# Patient Record
Sex: FEMALE | Race: WHITE
[De-identification: names, ages, dates, MRNs, and addresses within clinical notes are randomized per-mention and may not be internally consistent; named-entity substitution may affect disease eponyms.]

---

## 2020-01-28 ENCOUNTER — HOSPITAL ENCOUNTER (OUTPATIENT)
Dept: HOSPITAL 75 - LAB FS | Age: 25
End: 2020-01-28
Attending: FAMILY MEDICINE
Payer: MEDICAID

## 2020-01-28 DIAGNOSIS — R53.82: ICD-10-CM

## 2020-01-28 DIAGNOSIS — Z34.80: Primary | ICD-10-CM

## 2020-01-28 PROCEDURE — 82677 ASSAY OF ESTRIOL: CPT

## 2020-01-28 PROCEDURE — 82105 ALPHA-FETOPROTEIN SERUM: CPT

## 2020-01-28 PROCEDURE — 36415 COLL VENOUS BLD VENIPUNCTURE: CPT

## 2020-01-28 PROCEDURE — 86336 INHIBIN A: CPT

## 2020-01-28 PROCEDURE — 84439 ASSAY OF FREE THYROXINE: CPT

## 2020-01-28 PROCEDURE — 84443 ASSAY THYROID STIM HORMONE: CPT

## 2020-01-28 PROCEDURE — 84702 CHORIONIC GONADOTROPIN TEST: CPT

## 2020-01-29 LAB — T4 FREE SERPL-MCNC: 0.78 NG/DL (ref 0.7–1.48)

## 2020-03-31 ENCOUNTER — HOSPITAL ENCOUNTER (OUTPATIENT)
Dept: HOSPITAL 75 - LAB FS | Age: 25
End: 2020-03-31
Attending: FAMILY MEDICINE
Payer: MEDICAID

## 2020-03-31 DIAGNOSIS — Z34.80: Primary | ICD-10-CM

## 2020-03-31 LAB
BASOPHILS # BLD AUTO: 0.1 10^3/UL (ref 0–0.1)
BASOPHILS NFR BLD AUTO: 1 % (ref 0–10)
EOSINOPHIL # BLD AUTO: 0.2 10^3/UL (ref 0–0.3)
EOSINOPHIL NFR BLD AUTO: 2 % (ref 0–10)
ERYTHROCYTE [DISTWIDTH] IN BLOOD BY AUTOMATED COUNT: 13.5 % (ref 10–14.5)
HCT VFR BLD CALC: 30 % (ref 35–52)
HGB BLD-MCNC: 10.5 G/DL (ref 11.5–16)
LYMPHOCYTES # BLD AUTO: 1.4 X 10^3 (ref 1–4)
LYMPHOCYTES NFR BLD AUTO: 14 % (ref 12–44)
MANUAL DIFFERENTIAL PERFORMED BLD QL: NO
MCH RBC QN AUTO: 30 PG (ref 25–34)
MCHC RBC AUTO-ENTMCNC: 35 G/DL (ref 32–36)
MCV RBC AUTO: 86 FL (ref 80–99)
MONOCYTES # BLD AUTO: 0.5 X 10^3 (ref 0–1)
MONOCYTES NFR BLD AUTO: 5 % (ref 0–12)
NEUTROPHILS # BLD AUTO: 7.5 X 10^3 (ref 1.8–7.8)
NEUTROPHILS NFR BLD AUTO: 77 % (ref 42–75)
PLATELET # BLD: 144 10^3/UL (ref 130–400)
PMV BLD AUTO: 12.6 FL (ref 7.4–10.4)
WBC # BLD AUTO: 9.8 10^3/UL (ref 4.3–11)

## 2020-03-31 PROCEDURE — 82950 GLUCOSE TEST: CPT

## 2020-03-31 PROCEDURE — 36415 COLL VENOUS BLD VENIPUNCTURE: CPT

## 2020-03-31 PROCEDURE — 85025 COMPLETE CBC W/AUTO DIFF WBC: CPT

## 2020-03-31 PROCEDURE — 86780 TREPONEMA PALLIDUM: CPT

## 2020-06-02 ENCOUNTER — HOSPITAL ENCOUNTER (OUTPATIENT)
Dept: HOSPITAL 75 - LABNPT | Age: 25
End: 2020-06-02
Attending: FAMILY MEDICINE
Payer: MEDICAID

## 2020-06-02 DIAGNOSIS — Z3A.00: ICD-10-CM

## 2020-06-02 DIAGNOSIS — Z34.83: Primary | ICD-10-CM

## 2020-06-02 PROCEDURE — 87081 CULTURE SCREEN ONLY: CPT

## 2020-06-29 ENCOUNTER — HOSPITAL ENCOUNTER (INPATIENT)
Dept: HOSPITAL 75 - LDRP | Age: 25
LOS: 2 days | Discharge: HOME | End: 2020-07-01
Attending: FAMILY MEDICINE | Admitting: FAMILY MEDICINE
Payer: MEDICAID

## 2020-06-29 VITALS — SYSTOLIC BLOOD PRESSURE: 122 MMHG | DIASTOLIC BLOOD PRESSURE: 72 MMHG

## 2020-06-29 VITALS — DIASTOLIC BLOOD PRESSURE: 67 MMHG | SYSTOLIC BLOOD PRESSURE: 118 MMHG

## 2020-06-29 VITALS — WEIGHT: 261.03 LBS | BODY MASS INDEX: 42.45 KG/M2 | HEIGHT: 65.75 IN

## 2020-06-29 VITALS — DIASTOLIC BLOOD PRESSURE: 66 MMHG | SYSTOLIC BLOOD PRESSURE: 120 MMHG

## 2020-06-29 VITALS — DIASTOLIC BLOOD PRESSURE: 72 MMHG | SYSTOLIC BLOOD PRESSURE: 122 MMHG

## 2020-06-29 VITALS — SYSTOLIC BLOOD PRESSURE: 105 MMHG | DIASTOLIC BLOOD PRESSURE: 58 MMHG

## 2020-06-29 DIAGNOSIS — Z3A.39: ICD-10-CM

## 2020-06-29 LAB
BASOPHILS # BLD AUTO: 0 10^3/UL (ref 0–0.1)
BASOPHILS NFR BLD AUTO: 0 % (ref 0–10)
EOSINOPHIL # BLD AUTO: 0.2 10^3/UL (ref 0–0.3)
EOSINOPHIL NFR BLD AUTO: 2 % (ref 0–10)
ERYTHROCYTE [DISTWIDTH] IN BLOOD BY AUTOMATED COUNT: 13.9 % (ref 10–14.5)
HCT VFR BLD CALC: 33 % (ref 35–52)
HGB BLD-MCNC: 11.8 G/DL (ref 11.5–16)
LYMPHOCYTES # BLD AUTO: 1.4 X 10^3 (ref 1–4)
LYMPHOCYTES NFR BLD AUTO: 11 % (ref 12–44)
MANUAL DIFFERENTIAL PERFORMED BLD QL: NO
MCH RBC QN AUTO: 31 PG (ref 25–34)
MCHC RBC AUTO-ENTMCNC: 36 G/DL (ref 32–36)
MCV RBC AUTO: 86 FL (ref 80–99)
MONOCYTES # BLD AUTO: 0.7 X 10^3 (ref 0–1)
MONOCYTES NFR BLD AUTO: 6 % (ref 0–12)
NEUTROPHILS # BLD AUTO: 9.6 X 10^3 (ref 1.8–7.8)
NEUTROPHILS NFR BLD AUTO: 80 % (ref 42–75)
PLATELET # BLD: 146 10^3/UL (ref 130–400)
PMV BLD AUTO: (no result) FL (ref 7.4–10.4)
WBC # BLD AUTO: 12 10^3/UL (ref 4.3–11)

## 2020-06-29 PROCEDURE — 86901 BLOOD TYPING SEROLOGIC RH(D): CPT

## 2020-06-29 PROCEDURE — 85025 COMPLETE CBC W/AUTO DIFF WBC: CPT

## 2020-06-29 PROCEDURE — 36415 COLL VENOUS BLD VENIPUNCTURE: CPT

## 2020-06-29 PROCEDURE — 86850 RBC ANTIBODY SCREEN: CPT

## 2020-06-29 PROCEDURE — 86900 BLOOD TYPING SEROLOGIC ABO: CPT

## 2020-06-29 RX ADMIN — MISOPROSTOL SCH MCG: 100 TABLET ORAL at 22:24

## 2020-06-29 RX ADMIN — SODIUM CHLORIDE, SODIUM LACTATE, POTASSIUM CHLORIDE, CALCIUM CHLORIDE, AND DEXTROSE MONOHYDRATE SCH MLS/HR: 600; 310; 30; 20; 5 INJECTION, SOLUTION INTRAVENOUS at 21:34

## 2020-06-29 NOTE — XMS REPORT
Continuity of Care Document

                             Created on: 2020



Maritza Arreola

External Reference #: [id number]

: 1995

Sex: Female



Demographics





                          Address                   [address]  [address]

 

                          Home Phone                [phone] x

 

                          Preferred Language        Unknown

 

                          Marital Status            Unknown

 

                          Advent Affiliation     Unknown

 

                          Race                      Unknown

 

                          Ethnic Group              Unknown





Author





                          Organization              Unknown

 

                          Address                   Unknown

 

                          Phone                     Unavailable



              



Allergies

      



             Active           Description           Code           Type         

  Severity   

                Reaction           Onset           Reported/Identified          

 

Relationship to Patient                 Clinical Status        

 

                Yes             No Known Drug Allergies           [id number]    

       Drug 

Allergy           Unknown           N/A                             2020  

      

                                                             



                  



Medications

      



There is no data.                  



Problems

      



             Date Dx Coded           Attending           Type           Code    

       

Diagnosis                               Diagnosed By        

 

                2020           EBONI OMALLEY MD, Ot           

   Z34.80          

                          ENCOUNTER FOR SUPRVSN OF NORMAL PREGNANC              

      

 

                2020           EBONI OMALLEY MD           Ot           

   R53.82          

                          CHRONIC FATIGUE, UNSPECIFIED                    

 

                2020           EBONI OMALLEY MD           Ot           

   Z34.80          

                          ENCOUNTER FOR SUPRVSN OF NORMAL PREGNANC              

      

 

                2020           EBONI OMALLEY MD, Ot           

   R53.82          

                          CHRONIC FATIGUE, UNSPECIFIED                    

 

                2020           EBONI OMALLEY MD           Ot           

   Z34.80          

                          ENCOUNTER FOR SUPRVSN OF NORMAL PREGNANC              

      

 

                2020           EBONI OMALLEY MD           Ot           

   Z34.80          

                          ENCOUNTER FOR SUPRVSN OF NORMAL PREGNANC              

      

 

                2020           EBONI OMALLEY MD           Ot           

   R53.82          

                          CHRONIC FATIGUE, UNSPECIFIED                    

 

                2020           EBONI OMALLEY MD           Ot           

   Z34.80          

                          ENCOUNTER FOR SUPRVSN OF NORMAL PREGNANC              

      

 

                2020           EBONI OMALLEY MD           Ot           

   Z34.80          

                          ENCOUNTER FOR SUPRVSN OF NORMAL PREGNANC              

      

 

                2020           EBONI OMALLEY MD           Ot           

   Z34.83          

                          ENCOUNTER FOR SUPRVSN OF NORMAL PREGNANC              

      

 

                2020           EBONI OMALLEY MD           Ot           

   Z3A.00          

                          WEEKS OF GESTATION OF PREGNANCY NOT SPEC              

      

 

                06/10/2020           EBONI OMALLEY MD           Ot           

   Z34.83          

                          ENCOUNTER FOR SUPRVSN OF NORMAL PREGNANC              

      

 

                06/10/2020           EBONI OMALLEY MD, Ot           

   Z3A.00          

                          WEEKS OF GESTATION OF PREGNANCY NOT SPEC              

      



                                          



Procedures

      



There is no data.                  



Results

      



                    Test                Result              Range        

 

                                        HIV ANTIGEN/ANTIBODY - 19 14:42   

      

 

                    HIV AG/AB, 4TH GEN           NON-REACTIVE            NON-MERVAT

CTIVE        

 

                                        CBC - 19 14:42         

 

                    WHITE BLOOD CELL COUNT           7.9 Thousand/uL           3

.8-10.8        

 

                    RED BLOOD CELL COUNT           4.57 Million/uL           3.8

0-5.10        

 

                    HEMOGLOBIN           13.8 g/dL           11.7-15.5        

 

                    HEMATOCRIT           39.5 %              35.0-45.0        

 

                    MCV                 86.4 fL             80.0-100.0        

 

                    MCH                 30.2 pg             27.0-33.0        

 

                    MCHC                34.9 g/dL           32.0-36.0        

 

                    RDW                 13.0 %              11.0-15.0        

 

                    PLATELET COUNT           215 Thousand/uL           140-400  

      

 

                    MPV                 12.7 fL             7.5-12.5        

 

                    ABSOLUTE NEUTROPHILS           5570 cells/uL           1500-

7800        

 

                    ABSOLUTE LYMPHOCYTES           1596 cells/uL           850-3

900        

 

                    ABSOLUTE MONOCYTES           435 cells/uL           200-950 

       

 

                    ABSOLUTE EOSINOPHILS           237 cells/uL           

        

 

                    ABSOLUTE BASOPHILS           63 cells/uL           0-200    

    

 

                    NEUTROPHILS           70.5 %              NRG        

 

                    LYMPHOCYTES           20.2 %              NRG        

 

                    MONOCYTES           5.5 %               NRG        

 

                    EOSINOPHILS           3.0 %               NRG        

 

                    BASOPHILS           0.8 %               NRG        

 

                                        SYPHILIS (RPR W/ REFLEX CONFIRMATION) - 

19 14:42         

 

                          RPR (DX) W/REFL TITER AND CONFIRMATORY TESTING        

   NON-REACTIVE           

                                        NON-REACTIVE        

 

                                        HEP B SURFACE ANTIGEN - 19 14:42  

       

 

                    HEPATITIS B SURFACE ANTIGEN           NON-REACTIVE          

  NON-REACTIVE      

 

 

                                        RUBELLA IMMUNE STATUS - 19 14:42  

       

 

                    RUBELLA ANTIBODY (IGG)           1.84 index           NRG   

     

 

                                        GC/CHLAMYDIA (SWAB OR URINE)-RAPID -  13:36         

 

                    CHLAMYDIA TRACHOMATIS RNA, TMA           NOT DETECTED       

     NOT DETECTED   

    

 

                    NEISSERIA GONORRHOEAE RNA, TMA           NOT DETECTED       

     NOT DETECTED   

    

 

                    COMMENT                                 NRG        

 

                                        SUREPATH PAP RFX HPV mRNA E6/E7 -  13:36         

 

                    CLINICAL INFORMATION:                               NRG     

   

 

                    LMP:                                    NRG        

 

                    PREV. PAP:                               NRG        

 

                    PREV. BX:                               NRG        

 

                    SOURCE:             [employer]              NRG        

 

                    STATEMENT OF ADEQUACY:                               NRG    

    

 

                    INTERPRETATION/RESULT:                               NRG    

    

 

                    CYTOTECHNOLOGIST:                               NRG        

 

                    COMMENT                                 NRG        

 

                                        Streptococcus agalactiae detection by or

ganism specific culture - 20 13:00

        

 

                    QUANTITY OF GROWTH           .                   NRG        

 

                          Streptococcus agalactiae detection by organism specifi

c culture           

[id number]                                NRG        



                              



Encounters

      



                ACCT No.           Visit Date/Time           Discharge          

 Status         

             Pt. Type           Provider           Facility           Loc./Unit 

          

Complaint        

 

                [id number]           01/15/2020 11:00:00           01/15/2020 23:59:

59           CLS

                Outpatient           EBONI OMALLEY                           

[facility]                                 

 

             [id number]           2019 13:00:00                              

       Document

Registration                                                                    

 

             [id number]           2019 13:00:00                              

       Document

Registration                                                                    

 

                    [id number]           2020 15:40:00            23:59:59        

                CLS             Outpatient           EBONI OMALLEY MD        

   [facility]           LABNPT                             

 

                    [id number]           2020 09:39:00            23:59:59        

                CLS             Outpatient           EBONI OMALLEY MD        

   [facility]           LAB FS                    SUPERVISION OF OTHER NO

RMAL 

PREGNANCY        

 

                    [id number]           2020 15:17:00            23:59:59        

                CLS             Outpatient           EBONI OMALLEY MD        

   [facility]           LAB FS                    R53.82 Z34.80         

 

             [id number]           2020 20:37:00                        A

CT           

Inpatient                 EBONI OMALLEY MD           [facility]                 LDRP                      INDUCTION

## 2020-06-30 VITALS — DIASTOLIC BLOOD PRESSURE: 58 MMHG | SYSTOLIC BLOOD PRESSURE: 110 MMHG

## 2020-06-30 VITALS — SYSTOLIC BLOOD PRESSURE: 91 MMHG | DIASTOLIC BLOOD PRESSURE: 49 MMHG

## 2020-06-30 VITALS — SYSTOLIC BLOOD PRESSURE: 108 MMHG | DIASTOLIC BLOOD PRESSURE: 58 MMHG

## 2020-06-30 VITALS — DIASTOLIC BLOOD PRESSURE: 53 MMHG | SYSTOLIC BLOOD PRESSURE: 92 MMHG

## 2020-06-30 VITALS — SYSTOLIC BLOOD PRESSURE: 126 MMHG | DIASTOLIC BLOOD PRESSURE: 63 MMHG

## 2020-06-30 VITALS — SYSTOLIC BLOOD PRESSURE: 108 MMHG | DIASTOLIC BLOOD PRESSURE: 61 MMHG

## 2020-06-30 VITALS — SYSTOLIC BLOOD PRESSURE: 130 MMHG | DIASTOLIC BLOOD PRESSURE: 71 MMHG

## 2020-06-30 VITALS — SYSTOLIC BLOOD PRESSURE: 119 MMHG | DIASTOLIC BLOOD PRESSURE: 60 MMHG

## 2020-06-30 VITALS — DIASTOLIC BLOOD PRESSURE: 56 MMHG | SYSTOLIC BLOOD PRESSURE: 100 MMHG

## 2020-06-30 VITALS — DIASTOLIC BLOOD PRESSURE: 78 MMHG | SYSTOLIC BLOOD PRESSURE: 141 MMHG

## 2020-06-30 VITALS — SYSTOLIC BLOOD PRESSURE: 116 MMHG | DIASTOLIC BLOOD PRESSURE: 59 MMHG

## 2020-06-30 VITALS — SYSTOLIC BLOOD PRESSURE: 102 MMHG | DIASTOLIC BLOOD PRESSURE: 63 MMHG

## 2020-06-30 VITALS — SYSTOLIC BLOOD PRESSURE: 120 MMHG | DIASTOLIC BLOOD PRESSURE: 63 MMHG

## 2020-06-30 VITALS — DIASTOLIC BLOOD PRESSURE: 70 MMHG | SYSTOLIC BLOOD PRESSURE: 122 MMHG

## 2020-06-30 VITALS — DIASTOLIC BLOOD PRESSURE: 76 MMHG | SYSTOLIC BLOOD PRESSURE: 135 MMHG

## 2020-06-30 VITALS — DIASTOLIC BLOOD PRESSURE: 49 MMHG | SYSTOLIC BLOOD PRESSURE: 106 MMHG

## 2020-06-30 VITALS — DIASTOLIC BLOOD PRESSURE: 57 MMHG | SYSTOLIC BLOOD PRESSURE: 104 MMHG

## 2020-06-30 VITALS — SYSTOLIC BLOOD PRESSURE: 123 MMHG | DIASTOLIC BLOOD PRESSURE: 71 MMHG

## 2020-06-30 VITALS — DIASTOLIC BLOOD PRESSURE: 63 MMHG | SYSTOLIC BLOOD PRESSURE: 108 MMHG

## 2020-06-30 VITALS — SYSTOLIC BLOOD PRESSURE: 119 MMHG | DIASTOLIC BLOOD PRESSURE: 56 MMHG

## 2020-06-30 VITALS — SYSTOLIC BLOOD PRESSURE: 126 MMHG | DIASTOLIC BLOOD PRESSURE: 66 MMHG

## 2020-06-30 VITALS — DIASTOLIC BLOOD PRESSURE: 75 MMHG | SYSTOLIC BLOOD PRESSURE: 157 MMHG

## 2020-06-30 VITALS — SYSTOLIC BLOOD PRESSURE: 127 MMHG | DIASTOLIC BLOOD PRESSURE: 74 MMHG

## 2020-06-30 VITALS — SYSTOLIC BLOOD PRESSURE: 89 MMHG | DIASTOLIC BLOOD PRESSURE: 48 MMHG

## 2020-06-30 VITALS — DIASTOLIC BLOOD PRESSURE: 59 MMHG | SYSTOLIC BLOOD PRESSURE: 104 MMHG

## 2020-06-30 VITALS — SYSTOLIC BLOOD PRESSURE: 94 MMHG | DIASTOLIC BLOOD PRESSURE: 52 MMHG

## 2020-06-30 VITALS — SYSTOLIC BLOOD PRESSURE: 110 MMHG | DIASTOLIC BLOOD PRESSURE: 71 MMHG

## 2020-06-30 VITALS — SYSTOLIC BLOOD PRESSURE: 118 MMHG | DIASTOLIC BLOOD PRESSURE: 58 MMHG

## 2020-06-30 VITALS — SYSTOLIC BLOOD PRESSURE: 107 MMHG | DIASTOLIC BLOOD PRESSURE: 52 MMHG

## 2020-06-30 VITALS — SYSTOLIC BLOOD PRESSURE: 97 MMHG | DIASTOLIC BLOOD PRESSURE: 55 MMHG

## 2020-06-30 VITALS — SYSTOLIC BLOOD PRESSURE: 102 MMHG | DIASTOLIC BLOOD PRESSURE: 56 MMHG

## 2020-06-30 VITALS — SYSTOLIC BLOOD PRESSURE: 95 MMHG | DIASTOLIC BLOOD PRESSURE: 54 MMHG

## 2020-06-30 VITALS — SYSTOLIC BLOOD PRESSURE: 102 MMHG | DIASTOLIC BLOOD PRESSURE: 58 MMHG

## 2020-06-30 VITALS — DIASTOLIC BLOOD PRESSURE: 55 MMHG | SYSTOLIC BLOOD PRESSURE: 101 MMHG

## 2020-06-30 VITALS — SYSTOLIC BLOOD PRESSURE: 124 MMHG | DIASTOLIC BLOOD PRESSURE: 70 MMHG

## 2020-06-30 VITALS — SYSTOLIC BLOOD PRESSURE: 112 MMHG | DIASTOLIC BLOOD PRESSURE: 57 MMHG

## 2020-06-30 VITALS — SYSTOLIC BLOOD PRESSURE: 117 MMHG | DIASTOLIC BLOOD PRESSURE: 60 MMHG

## 2020-06-30 VITALS — SYSTOLIC BLOOD PRESSURE: 125 MMHG | DIASTOLIC BLOOD PRESSURE: 64 MMHG

## 2020-06-30 VITALS — DIASTOLIC BLOOD PRESSURE: 67 MMHG | SYSTOLIC BLOOD PRESSURE: 127 MMHG

## 2020-06-30 VITALS — SYSTOLIC BLOOD PRESSURE: 124 MMHG | DIASTOLIC BLOOD PRESSURE: 66 MMHG

## 2020-06-30 VITALS — SYSTOLIC BLOOD PRESSURE: 123 MMHG | DIASTOLIC BLOOD PRESSURE: 72 MMHG

## 2020-06-30 VITALS — SYSTOLIC BLOOD PRESSURE: 138 MMHG | DIASTOLIC BLOOD PRESSURE: 80 MMHG

## 2020-06-30 VITALS — DIASTOLIC BLOOD PRESSURE: 60 MMHG | SYSTOLIC BLOOD PRESSURE: 118 MMHG

## 2020-06-30 VITALS — SYSTOLIC BLOOD PRESSURE: 121 MMHG | DIASTOLIC BLOOD PRESSURE: 67 MMHG

## 2020-06-30 VITALS — DIASTOLIC BLOOD PRESSURE: 58 MMHG | SYSTOLIC BLOOD PRESSURE: 111 MMHG

## 2020-06-30 VITALS — DIASTOLIC BLOOD PRESSURE: 50 MMHG | SYSTOLIC BLOOD PRESSURE: 91 MMHG

## 2020-06-30 VITALS — SYSTOLIC BLOOD PRESSURE: 109 MMHG | DIASTOLIC BLOOD PRESSURE: 61 MMHG

## 2020-06-30 VITALS — SYSTOLIC BLOOD PRESSURE: 144 MMHG | DIASTOLIC BLOOD PRESSURE: 72 MMHG

## 2020-06-30 VITALS — SYSTOLIC BLOOD PRESSURE: 123 MMHG | DIASTOLIC BLOOD PRESSURE: 68 MMHG

## 2020-06-30 VITALS — SYSTOLIC BLOOD PRESSURE: 135 MMHG | DIASTOLIC BLOOD PRESSURE: 71 MMHG

## 2020-06-30 VITALS — SYSTOLIC BLOOD PRESSURE: 116 MMHG | DIASTOLIC BLOOD PRESSURE: 60 MMHG

## 2020-06-30 VITALS — DIASTOLIC BLOOD PRESSURE: 72 MMHG | SYSTOLIC BLOOD PRESSURE: 117 MMHG

## 2020-06-30 VITALS — SYSTOLIC BLOOD PRESSURE: 119 MMHG | DIASTOLIC BLOOD PRESSURE: 58 MMHG

## 2020-06-30 VITALS — SYSTOLIC BLOOD PRESSURE: 125 MMHG | DIASTOLIC BLOOD PRESSURE: 73 MMHG

## 2020-06-30 VITALS — DIASTOLIC BLOOD PRESSURE: 56 MMHG | SYSTOLIC BLOOD PRESSURE: 104 MMHG

## 2020-06-30 VITALS — DIASTOLIC BLOOD PRESSURE: 52 MMHG | SYSTOLIC BLOOD PRESSURE: 116 MMHG

## 2020-06-30 VITALS — DIASTOLIC BLOOD PRESSURE: 67 MMHG | SYSTOLIC BLOOD PRESSURE: 130 MMHG

## 2020-06-30 VITALS — SYSTOLIC BLOOD PRESSURE: 116 MMHG | DIASTOLIC BLOOD PRESSURE: 70 MMHG

## 2020-06-30 VITALS — DIASTOLIC BLOOD PRESSURE: 59 MMHG | SYSTOLIC BLOOD PRESSURE: 115 MMHG

## 2020-06-30 VITALS — DIASTOLIC BLOOD PRESSURE: 56 MMHG | SYSTOLIC BLOOD PRESSURE: 102 MMHG

## 2020-06-30 VITALS — SYSTOLIC BLOOD PRESSURE: 125 MMHG | DIASTOLIC BLOOD PRESSURE: 68 MMHG

## 2020-06-30 VITALS — DIASTOLIC BLOOD PRESSURE: 55 MMHG | SYSTOLIC BLOOD PRESSURE: 111 MMHG

## 2020-06-30 VITALS — DIASTOLIC BLOOD PRESSURE: 62 MMHG | SYSTOLIC BLOOD PRESSURE: 108 MMHG

## 2020-06-30 VITALS — DIASTOLIC BLOOD PRESSURE: 57 MMHG | SYSTOLIC BLOOD PRESSURE: 116 MMHG

## 2020-06-30 VITALS — DIASTOLIC BLOOD PRESSURE: 64 MMHG | SYSTOLIC BLOOD PRESSURE: 132 MMHG

## 2020-06-30 VITALS — DIASTOLIC BLOOD PRESSURE: 57 MMHG | SYSTOLIC BLOOD PRESSURE: 115 MMHG

## 2020-06-30 VITALS — DIASTOLIC BLOOD PRESSURE: 70 MMHG | SYSTOLIC BLOOD PRESSURE: 123 MMHG

## 2020-06-30 RX ADMIN — SODIUM CHLORIDE, SODIUM LACTATE, POTASSIUM CHLORIDE, CALCIUM CHLORIDE, AND DEXTROSE MONOHYDRATE SCH MLS/HR: 600; 310; 30; 20; 5 INJECTION, SOLUTION INTRAVENOUS at 13:04

## 2020-06-30 RX ADMIN — SODIUM CHLORIDE, SODIUM LACTATE, POTASSIUM CHLORIDE, CALCIUM CHLORIDE, AND DEXTROSE MONOHYDRATE SCH MLS/HR: 600; 310; 30; 20; 5 INJECTION, SOLUTION INTRAVENOUS at 05:46

## 2020-06-30 RX ADMIN — DOCUSATE SODIUM SCH MG: 100 CAPSULE ORAL at 20:56

## 2020-06-30 RX ADMIN — MISOPROSTOL SCH MCG: 100 TABLET ORAL at 14:49

## 2020-06-30 RX ADMIN — IBUPROFEN SCH MG: 600 TABLET ORAL at 18:12

## 2020-06-30 RX ADMIN — ACETAMINOPHEN SCH MG: 500 TABLET ORAL at 20:56

## 2020-06-30 RX ADMIN — MISOPROSTOL SCH MCG: 100 TABLET ORAL at 02:34

## 2020-06-30 NOTE — NUR
VSS.  ffu/0 with lt-mod rubra noted, no clots expressed.

1747 vss ffu/0 with mod rubra, 2 small 1cm clots expressed. vss

1802 vss .  ffu/0 with lt-mod rubra noted, no clots expressed.  pericare, pad changed and 
underwear on.  Gown changed.

## 2020-06-30 NOTE — NUR
pt sitting up in bed. Assessment completed. Pt still unable to move right lower extremity. 
Will wait to move to postpartum room. ff 2 below. light/moderate rubra. Pt denies any needs 
at this time. Will continue to monitor.

## 2020-06-30 NOTE — OB LABOR & DELIVERY RECORD
Vag Delivery Note


Vag Delivery Note


Date of Delivery: 20 





Preoperative Diagnosis: Maritza Arreola  is a (24  /Para   2/1 ,

Gestational Age (40 wks)with [0 days]





Postoperative Diagnosis: Same


Surgeon: EBONI OMALLEY 





Assistant: [none]





Anesthesia: [epidural]





Delivery Type: []





Findings: []


Viable [female] infant, apgars [7/9], weight [8 pounds 0 ounces]


Lacerations:


Intact placenta with 3 vessel cord. Loose nuchal cord times one with true knot 

loose. 





Estimated Blood Loss: [200] ml





Complications: None





Condition: Stable





Description of Procedure:





The patient is a 24 year old female who presented [for induction]. She was 

admitted and informed consent was obtained. Her labor course was remarkable for 

[nothing] She progressed to complete dilatation and began to push. 





She was then set up for delivery. The infant's head was delivered atraumatically

 in the [OA] position. The shoulders and remainder of the infant's body were 

then delivered without difficulty. Upon delivery, the head was held below the 

level of the perineum and the mouth and nares were bulb suctioned. The cord was 

doubly clamped and cut after 60 seconds on maternal abdomen by father of baby.  

 An intact placenta with 3-vessel cord delivered via Francis and there was found

 to be minimal bleeding.~ Vigorous fundal massage was performed and the fundus 

was found to be firm. IV oxytocin was given. Examination of the vagina and 

perineum revealed no lacerations.. Following the repair, sponge, instrument and 

needle counts were correct. Mom and baby were both in stable condition in the 

labor suite.





Vitals - Labs


Vital Signs - I&O





Vital Signs








  Date Time  Temp Pulse Resp B/P (MAP) Pulse Ox O2 Delivery O2 Flow Rate FiO2


 


20 16:30  70 18 111/58 (75)  Room Air  


 


20 16:15 36.4 67 18 124/66 (85)  Room Air  


 


20 16:00   18 110/71 (84)  Room Air  


 


20 15:45  70 18 108/62 (77) 98 Room Air  


 


20 15:30  79 18 92/53 (66) 98 Room Air  


 


20 15:15  79 18 92/53 (66) 98 Room Air  


 


20 15:02  74 18 91/50 (64) 97 Room Air  


 


20 15:00      Room Air  


 


20 14:45  74 18 116/52 (73) 100 Room Air  


 


20 14:30  71 18 116/57 (76) 98 Room Air  


 


20 14:27  71 18 118/60 (79) 98 Room Air  


 


20 14:24  73 18 116/59 (78) 98 Room Air  


 


20 14:21  74 18 119/58 (78) 98 Room Air  


 


20 14:18  72 18 119/56 (77) 98 Room Air  


 


20 14:15  73 18 116/60 (78) 98 Room Air  


 


20 14:12  72 18 120/63 (82) 98 Room Air  


 


20 14:09 36.4 77 18 117/60 (79) 99 Room Air  


 


20 14:06  77 18 112/57 (75) 99 Room Air  


 


20 14:03  80 18 125/64 (84) 100 Room Air  


 


20 14:00  80 18 126/63 (84) 100 Room Air  


 


20 13:55  75 18 119/60 (79) 100 Room Air  


 


20 13:50  75 18 157/75 (102) 100 Room Air  


 


20 13:45  78 18 132/64 (86) 100 Room Air  


 


20 13:30  73 18 123/70 (87)  Room Air  


 


20 13:15  63 18 135/76 (95)  Room Air  


 


20 13:00 36.6     Room Air  


 


20 12:45  69 18 135/71 (92)  Room Air  


 


20 11:45  74  130/67 (88)  Room Air  


 


20 11:30  65  124/70 (88)  Room Air  


 


20 11:15  66  126/66 (86)  Room Air  


 


20 11:00  67 20 141/78 (99)  Room Air  


 


20 10:30  72 20 118/58 (78)  Room Air  


 


20 10:15 36.7 72  138/80 (99)    


 


20 10:00  76  123/72 (89)  Room Air  


 


20 09:45  76  123/71 (88)  Room Air  


 


20 09:30  69  121/67 (85)  Room Air  


 


20 09:15  74 18 108/58 (75)  Room Air  


 


20 09:00  74 18 100/56 (71)  Room Air  


 


20 08:45  68 18 127/67 (87)  Room Air  


 


20 08:30  94 18 144/72 (96)  Room Air  


 


20 08:15  72 18 104/59 (74)  Room Air  


 


20 08:00  68 18 104/56 (72)  Room Air  


 


20 07:45  72 18 104/59 (74)  Room Air  


 


20 07:30 36.2 80 18 108/61 (77)  Room Air  


 


20 07:15  82 18 116/70 (85)  Room Air  


 


20 07:00  81 18 125/73 (90)  Room Air  


 


20 06:45  91 18 127/74 (91)  Room Air  


 


20 06:30  75 18 109/61 (77)  Room Air  


 


20 06:00  68 18 101/55 (70)  Room Air  


 


20 05:30  71 18 102/56 (71)  Room Air  


 


20 04:30  83 18 110/58 (75)  Room Air  


 


20 04:00  76 18 111/58 (75)  Room Air  


 


20 03:30  73 18 125/68 (87)  Room Air  


 


20 03:00  81 18 94/52 (66)  Room Air  


 


20 02:30 36.5 83 18 104/57 (73)  Room Air  


 


20 02:00  81 18 122/70 (87)  Room Air  


 


20 01:30  86 18 123/68 (86)  Room Air  


 


20 01:00  81 18 130/71 (90)  Room Air  


 


20 00:30  89 18 102/56 (71)  Room Air  


 


20 00:00  90 18 117/72 (87)  Room Air  


 


20 23:39 36.4 106 18  97 Room Air  


 


20 23:30  86 18 120/66 (84)  Room Air  


 


20 23:00  101 18 105/58 (74)  Room Air  


 


20 22:30  97 18 118/67 (84) 97 Room Air  


 


20 21:30 36.4 106 18 122/72 (89) 97 Room Air  











Labs


Laboratory Tests


20 21:25: 


White Blood Count 12.0H, Red Blood Count 3.87L, Hemoglobin 11.8, Hematocrit 33L,

 Mean Corpuscular Volume 86, Mean Corpuscular Hemoglobin 31, Mean Corpuscular 

Hemoglobin Concent 36, Red Cell Distribution Width 13.9, Platelet Count 146, 

Mean Platelet Volume , Neutrophils (%) (Auto) 80H, Lymphocytes (%) (Auto) 11L, 

Monocytes (%) (Auto) 6, Eosinophils (%) (Auto) 2, Basophils (%) (Auto) 0, 

Neutrophils # (Auto) 9.6H, Lymphocytes # (Auto) 1.4, Monocytes # (Auto) 0.7, 

Eosinophils # (Auto) 0.2, Basophils # (Auto) 0.0











EBONI OMALLEY MD             2020 17:30

## 2020-06-30 NOTE — HISTORY & PHYSICAL-OB
OB - Chief Complaint & HPI


Date/Time


Date of Admission:


Date of Admission:  2020 at 20:37


Date seen by a Provider:  2020


Time Seen by a Provider:  17:00





Chief Complaint/History


OB-Reason for Admission/Chief:  Induction of Labor


Hx :  2


Hx Para:  1


Gestational Age in Weeks:  40


Gestational Age in Days:  0


Indication for induction:  post dates


Admission Nurse Assessment Rev:  Yes





Allergies and Home Medications


Allergies


Coded Allergies:  


     No Known Drug Allergies (Unverified , 20)





Patient Home Medication List


Home Medication List Reviewed:  Yes





OB - History


Hx of Present Pregnancy


Prenatal Care:  Yes


Ultrasounds:  Normal mid trimester US


Obstetrical Complications:  None


Medical Complications:  None





Delivery History


Adverse Rxn to Tranfusion:  No





Patient Past Medical History





previously healthy





Social History/Family History


Recent Infectious Disease Expo:  No


Alcohol Use:  Denies Use


Recreational Drug Use:  No





OB - Admission Exam


Physical Exam


Vitals:





Vital Signs








 20





 15:45 16:15 16:30


 


Temp  36.4 


 


Pulse   70


 


Resp   18


 


B/P (MAP)   111/58 (75)


 


Pulse Ox 98  


 


O2 Delivery   Room Air








HEENT:  NCAT


Heart:  Rhythm Normal


Lungs:  Clear


Abdomen:  Gravid


Extremities:  Normal


Reflexes:  Normal


Cervical Dilatation:  10cm


Effacement:  100%


Station:  +1


Membranes:  Ruptured


Amniotic Fluid:  Clear


Fetal Heart Rate:  130's


Accelerations:  Accelerations Present


Decelerations:  No Decelerations


Short Term Variability:  Present


Long Term Variability:  Average (6-25)


Contractions on Admission:  None


Labs





Laboratory Tests








Test


 20


21:25 Range/Units


 


 


White Blood Count


 12.0 H


 4.3-11.0


10^3/uL


 


Red Blood Count


 3.87 L


 4.35-5.85


10^6/uL


 


Hemoglobin 11.8  11.5-16.0  G/DL


 


Hematocrit 33 L 35-52  %


 


Mean Corpuscular Volume 86  80-99  FL


 


Mean Corpuscular Hemoglobin 31  25-34  PG


 


Mean Corpuscular Hemoglobin


Concent 36 


 32-36  G/DL





 


Red Cell Distribution Width 13.9  10.0-14.5  %


 


Platelet Count


 146 


 130-400


10^3/uL


 


Mean Platelet Volume   7.4-10.4  FL


 


Neutrophils (%) (Auto) 80 H 42-75  %


 


Lymphocytes (%) (Auto) 11 L 12-44  %


 


Monocytes (%) (Auto) 6  0-12  %


 


Eosinophils (%) (Auto) 2  0-10  %


 


Basophils (%) (Auto) 0  0-10  %


 


Neutrophils # (Auto) 9.6 H 1.8-7.8  X 10^3


 


Lymphocytes # (Auto) 1.4  1.0-4.0  X 10^3


 


Monocytes # (Auto) 0.7  0.0-1.0  X 10^3


 


Eosinophils # (Auto)


 0.2 


 0.0-0.3


10^3/uL


 


Basophils # (Auto)


 0.0 


 0.0-0.1


10^3/uL











OB - Assessment/Plan/Diagnosis


Assessment


Assessment:  induction of labor


Admission Dx


Induction of labor at 40 weeks.


Admission Status:  Inpatient Order (span 2 midnights)


Reason for Inpatient Admission:  


Induction of labor.





Plan


Induction Method:  per Pitocin Protocol


Other Plan


Cytotec last night times 2 vaginally.  Pitocin today with AROM.  Will push.  

Comfortable with epidural.  GBS negative.











EBONI OMALLEY MD             2020 17:27

## 2020-06-30 NOTE — NUR
pt assisted to the w'c and taken to the bathroom. Positive void. light/moderate rubra. 
pericare completed. pad changed. pt tx down to 309. pt orientated to the room. info papers 
discussed. pt denies any concerns at this time. will continue to monitor.

## 2020-06-30 NOTE — NUR
Returned text to 

-------------------------------------------------------------------------------

Addendum: 06/30/20 at 1108 by CRESENCIO CASTRO RN

-------------------------------------------------------------------------------

Dr Little. Pt rates pain 6. Breathing and moving frequently with contractions. Difficult to 
monitor due to pt's size and frequent movement.

## 2020-06-30 NOTE — NUR
Spontaneous vaginal delivery of placenta with cord attached.  fundal massage per dr castrejon 
with minimal bleeding noted.  perineum examined per dr castrejon, no repairs needed.

plan of care reviewed with pt regarding recovery period

1725 epidural infusion turned off at this time.  pt assisted to sf position.  

1726 ffu/0 with lt-mod rubra noted, no clots expressed.

1730 epidural catheter dc'd with tip intact

## 2020-07-01 VITALS — DIASTOLIC BLOOD PRESSURE: 72 MMHG | SYSTOLIC BLOOD PRESSURE: 116 MMHG

## 2020-07-01 VITALS — DIASTOLIC BLOOD PRESSURE: 51 MMHG | SYSTOLIC BLOOD PRESSURE: 96 MMHG

## 2020-07-01 VITALS — DIASTOLIC BLOOD PRESSURE: 60 MMHG | SYSTOLIC BLOOD PRESSURE: 95 MMHG

## 2020-07-01 VITALS — SYSTOLIC BLOOD PRESSURE: 122 MMHG | DIASTOLIC BLOOD PRESSURE: 88 MMHG

## 2020-07-01 LAB
BASOPHILS # BLD AUTO: 0 10^3/UL (ref 0–0.1)
BASOPHILS NFR BLD AUTO: 0 % (ref 0–10)
EOSINOPHIL # BLD AUTO: 0.2 10^3/UL (ref 0–0.3)
EOSINOPHIL NFR BLD AUTO: 2 % (ref 0–10)
ERYTHROCYTE [DISTWIDTH] IN BLOOD BY AUTOMATED COUNT: 14.1 % (ref 10–14.5)
HCT VFR BLD CALC: 29 % (ref 35–52)
HGB BLD-MCNC: 9.9 G/DL (ref 11.5–16)
LYMPHOCYTES # BLD AUTO: 1.5 X 10^3 (ref 1–4)
LYMPHOCYTES NFR BLD AUTO: 13 % (ref 12–44)
MANUAL DIFFERENTIAL PERFORMED BLD QL: NO
MCH RBC QN AUTO: 30 PG (ref 25–34)
MCHC RBC AUTO-ENTMCNC: 35 G/DL (ref 32–36)
MCV RBC AUTO: 86 FL (ref 80–99)
MONOCYTES # BLD AUTO: 0.8 X 10^3 (ref 0–1)
MONOCYTES NFR BLD AUTO: 7 % (ref 0–12)
NEUTROPHILS # BLD AUTO: 8.5 X 10^3 (ref 1.8–7.8)
NEUTROPHILS NFR BLD AUTO: 78 % (ref 42–75)
PLATELET # BLD: 114 10^3/UL (ref 130–400)
PMV BLD AUTO: 12.6 FL (ref 7.4–10.4)
WBC # BLD AUTO: 10.9 10^3/UL (ref 4.3–11)

## 2020-07-01 RX ADMIN — ACETAMINOPHEN SCH MG: 500 TABLET ORAL at 04:01

## 2020-07-01 RX ADMIN — DOCUSATE SODIUM SCH MG: 100 CAPSULE ORAL at 11:00

## 2020-07-01 RX ADMIN — ACETAMINOPHEN SCH MG: 500 TABLET ORAL at 13:55

## 2020-07-01 RX ADMIN — IBUPROFEN SCH MG: 600 TABLET ORAL at 00:06

## 2020-07-01 RX ADMIN — IBUPROFEN SCH MG: 600 TABLET ORAL at 13:56

## 2020-07-01 RX ADMIN — IBUPROFEN SCH MG: 600 TABLET ORAL at 05:24

## 2020-07-01 NOTE — DISCHARGE SUMMARY
Diagnosis/Chief Complaint


Date of Admission


Jun 29, 2020 at 20:37


Date of Discharge


July 1, 2020


Admission Diagnosis


Admission Diagnosis


Induction of labor at 40 weeks





Discharge Diagnosis


Vaginal delivery at 40 weeks.


Problems/Diagnosis:  


(1) Elective induction of labor planned





Discharge Summary-OBS


Procedures


None.


Discharge Physical Examination


Allergies:  


Coded Allergies:  


     No Known Drug Allergies (Unverified , 6/29/20)


Vitals & I&Os











Intake and Output 


 


 7/1/20





 00:00


 


Intake Total 3600 ml


 


Balance 3600 ml








Vital Sign - Last 12Hours








  Date Time  Temp Pulse Resp B/P (MAP) Pulse Ox O2 Delivery O2 Flow Rate FiO2


 


7/1/20 13:57  101 18 122/88 (99) 99 Room Air  


 


7/1/20 08:57 36.3       








General Appearance:  Alert, Oriented X3


HEENT:  Atraumatic


Respiratory:  Clear to Auscultation


Cardiovascular:  Regular Rate


Extremities:  No Edema


Skin:  No Rashes


Neuro:  Normal Gait


Psych/Mental Status:  Mental Status NL





Hospital Course


Was the Problem List Reviewed?:  Yes


Uneventful vaginal delivery and postpartum course.


Labs


Laboratory Tests


7/1/20 05:17: 


White Blood Count 10.9, Red Blood Count 3.31L, Hemoglobin 9.9L, Hematocrit 29L, 

Mean Corpuscular Volume 86, Mean Corpuscular Hemoglobin 30, Mean Corpuscular 

Hemoglobin Concent 35, Red Cell Distribution Width 14.1, Platelet Count 114L, 

Mean Platelet Volume 12.6H, Neutrophils (%) (Auto) 78H, Lymphocytes (%) (Auto) 

13, Monocytes (%) (Auto) 7, Eosinophils (%) (Auto) 2, Basophils (%) (Auto) 0, 

Neutrophils # (Auto) 8.5H, Lymphocytes # (Auto) 1.5, Monocytes # (Auto) 0.8, 

Eosinophils # (Auto) 0.2, Basophils # (Auto) 0.0





Discharge


Instructions to patient/family


Please see electronic discharge instructions given to patient.


Discharge Medications


Reviewed and agree with Discharge Medication list on patient's Discharge 

Instruction sheet





Clinical Quality Measures


DVT/VTE Risk/Contraindication:


Risk Factor Score Per Nursing:  3


RFS Level Per Nursing on Admit:  3=High











EBONI OMALLEY MD              Jul 1, 2020 16:10

## 2020-07-01 NOTE — NUR
Discharge instuctions explained, signed and copy to patient.   pt verbalized understanding 
and denied questions.

## 2020-07-01 NOTE — ANESTHESIA-REGIONAL POST-OP
Regional


Patient Condition


Mental Status:  Alert, Oriented x3


Circulation:  Same as Pre-Op


Headache:  Absent


Sensation:  Full Recovery


Motor Block:  Absent





Post Op Complications


Complications


None





Follow Up Care/Instructions


Patient Instructions


None needed.





Anesthesia/Patient Condition


Patient is doing well, no complaints, stable vital signs, no apparent adverse 

anesthesia problems.











SANDRA GARZON DO          Jul 1, 2020 15:51

## 2020-07-01 NOTE — DISCHARGE SUMMARY
Discharge Inst-[facility]


Reconcile Patient Problems


Problems Reviewed?:  Yes





Depart Medications


New, Converted or Re-Newed RX:  Transmitted to Pharmacy





Follow Up/Instructions


Goal/Follow Up:  


6 weeks with Dr. Omalley





Activity


Activity:  Activity as Tolerated


Driving Instructions:  You May Drive


NO SMOKING:  NO SMOKING


Nothing Inside Vagina:  No Douching, No [place], No Tampons





Diet


Discharge Diet:  No Restrictions


Symptoms to Report to :  Fever Over 101 Degrees F, Vaginal Bleeding Increase


For Any Problems or Questions:  Contact Your Physician











EBONI OMALLEY MD              Jul 1, 2020 16:08

## 2021-05-27 ENCOUNTER — HOSPITAL ENCOUNTER (OUTPATIENT)
Dept: HOSPITAL 75 - LABNPT | Age: 26
End: 2021-05-27
Attending: FAMILY MEDICINE
Payer: MEDICAID

## 2021-05-27 DIAGNOSIS — N89.8: Primary | ICD-10-CM

## 2021-05-27 PROCEDURE — 87591 N.GONORRHOEAE DNA AMP PROB: CPT

## 2021-05-27 PROCEDURE — 87491 CHLMYD TRACH DNA AMP PROBE: CPT

## 2021-05-27 PROCEDURE — 87210 SMEAR WET MOUNT SALINE/INK: CPT
